# Patient Record
Sex: MALE | Race: BLACK OR AFRICAN AMERICAN | Employment: STUDENT | URBAN - METROPOLITAN AREA
[De-identification: names, ages, dates, MRNs, and addresses within clinical notes are randomized per-mention and may not be internally consistent; named-entity substitution may affect disease eponyms.]

---

## 2017-09-28 ENCOUNTER — HOSPITAL ENCOUNTER (EMERGENCY)
Age: 32
Discharge: HOME | End: 2017-09-28
Attending: EMERGENCY MEDICINE | Admitting: EMERGENCY MEDICINE
Payer: MEDICAID

## 2017-09-28 VITALS
HEART RATE: 88 BPM | SYSTOLIC BLOOD PRESSURE: 150 MMHG | TEMPERATURE: 97.6 F | WEIGHT: 260.36 LBS | DIASTOLIC BLOOD PRESSURE: 97 MMHG | HEIGHT: 71 IN | RESPIRATION RATE: 18 BRPM | OXYGEN SATURATION: 100 % | BODY MASS INDEX: 36.45 KG/M2
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
## 2017-09-28 DIAGNOSIS — S20.361A INSECT BITE OF CHEST, RIGHT, INITIAL ENCOUNTER: Primary | ICD-10-CM

## 2017-09-28 PROCEDURE — 99282 EMERGENCY DEPT VISIT SF MDM: CPT

## 2017-09-28 RX ORDER — CEPHALEXIN 500 MG/1
500 CAPSULE ORAL 4 TIMES DAILY
Qty: 28 CAP | Refills: 0 | Status: SHIPPED | OUTPATIENT
Start: 2017-09-28 | End: 2017-10-05

## 2017-09-28 NOTE — DISCHARGE INSTRUCTIONS
Insect Stings and Bites: Care Instructions  Your Care Instructions  Stings and bites from bees, wasps, ants, and other insects often cause pain, swelling, redness, and itching. In some people, especially children, the redness and swelling may be worse. It may extend several inches beyond the affected area. But in most cases, stings and bites don't cause reactions all over the body. If you have had a reaction to an insect sting or bite, you are at risk for a reaction if you get stung or bitten again. Follow-up care is a key part of your treatment and safety. Be sure to make and go to all appointments, and call your doctor if you are having problems. It's also a good idea to know your test results and keep a list of the medicines you take. How can you care for yourself at home? · Do not scratch or rub the skin where the sting or bite occurred. · Put a cold pack or ice cube on the area. Put a thin cloth between the ice and your skin. For some people, a paste of baking soda mixed with a little water helps relieve pain and decrease the reaction. · Take an over-the-counter antihistamine, such as diphenhydramine (Benadryl) or loratadine (Claritin), to relieve swelling, redness, and itching. Calamine lotion or hydrocortisone cream may also help. Do not give antihistamines to your child unless you have checked with the doctor first.  · Be safe with medicines. If your doctor prescribed medicine for your allergy, take it exactly as prescribed. Call your doctor if you think you are having a problem with your medicine. You will get more details on the specific medicines your doctor prescribes. · Your doctor may prescribe a shot of epinephrine to carry with you in case you have a severe reaction. Learn how and when to give yourself the shot, and keep it with you at all times. Make sure it has not . · Go to the emergency room anytime you have a severe reaction.  Go even if you have given yourself epinephrine and are feeling better. Symptoms can come back. When should you call for help? Call 911 anytime you think you may need emergency care. For example, call if:  · You have symptoms of a severe allergic reaction. These may include:  ¨ Sudden raised, red areas (hives) all over your body. ¨ Swelling of the throat, mouth, lips, or tongue. ¨ Trouble breathing. ¨ Passing out (losing consciousness). Or you may feel very lightheaded or suddenly feel weak, confused, or restless. Call your doctor now or seek immediate medical care if:  · You have symptoms of an allergic reaction not right at the sting or bite, such as:  ¨ A rash or small area of hives (raised, red areas on the skin). ¨ Itching. ¨ Swelling. ¨ Belly pain, nausea, or vomiting. · You have a lot of swelling around the site (such as your entire arm or leg is swollen). · You have signs of infection, such as:  ¨ Increased pain, swelling, redness, or warmth around the sting. ¨ Red streaks leading from the area. ¨ Pus draining from the sting. ¨ A fever. Watch closely for changes in your health, and be sure to contact your doctor if:  · You do not get better as expected. Where can you learn more? Go to http://jorge-cyndi.info/. Enter P390 in the search box to learn more about \"Insect Stings and Bites: Care Instructions. \"  Current as of: March 20, 2017  Content Version: 11.3  © 2692-8286 [employer]. Care instructions adapted under license by [employer] (which disclaims liability or warranty for this information). If you have questions about a medical condition or this instruction, always ask your healthcare professional. [place] [address] any warranty or liability for your use of this information.

## 2017-09-28 NOTE — ED PROVIDER NOTES
HPI Comments: Tana Ayala is a 34 y.o. male, with no significant pmhx, who presents ambulatory to the ED c/o worsening red, raised bump to his right upper flank x 3 days. Pt states that 3 days ago, he walked through [place], and thinks that he was bit by the spider. Pt never saw an insect, but started noticing the bump get worse. Pt denies any drainage from the bump, vomiting, abdominal pain, fever, or chills. Pt denies any known hx of long standing diseases or daily medication. PCP: Unknown provided located in [facility] Hx: (-) tobacco, (-) EtOH,  (-) Illicit Drugs    There are no other complaints, changes, or physical findings at this time. The history is provided by the patient. No  was used. No past medical history on file. No past surgical history on file. No family history on file. Social History     Social History    Marital status: SINGLE     Spouse name: N/A    Number of children: N/A    Years of education: N/A     Occupational History    Not on file. Social History Main Topics    Smoking status: Not on file    Smokeless tobacco: Not on file    Alcohol use Not on file    Drug use: Not on file    Sexual activity: Not on file     Other Topics Concern    Not on file     Social History Narrative         ALLERGIES: Review of patient's allergies indicates no known allergies. Review of Systems   Constitutional: Negative. Negative for chills and fever. HENT: Negative. Eyes: Negative. Respiratory: Negative. Cardiovascular: Negative. Gastrointestinal: Negative. Negative for abdominal pain and vomiting. Genitourinary: Negative. Musculoskeletal: Negative. Skin: Positive for color change. Neurological: Negative. Psychiatric/Behavioral: Negative. All other systems reviewed and are negative.       Vitals:    09/28/17 1357   BP: (!) 150/97   Pulse: 88   Resp: 18   Temp: 97.6 °F (36.4 °C)   SpO2: 100%   Weight: 118.1 kg (260 lb 5.8 oz)   Height: 5' 11\" (1.803 m)            Physical Exam   Constitutional: He is oriented to person, place, and time. He appears well-developed and well-nourished. No distress. 33 yo -American male   HENT:   Head: Normocephalic and atraumatic. Eyes: Conjunctivae are normal. Right eye exhibits no discharge. Left eye exhibits no discharge. Neck: Normal range of motion. Neck supple. Cardiovascular: Normal rate and regular rhythm. No murmur heard. Pulmonary/Chest: Effort normal and breath sounds normal. No respiratory distress. He has no wheezes. Lymphadenopathy:     He has no cervical adenopathy. Neurological: He is alert and oriented to person, place, and time. Skin: Skin is warm and dry. He is not diaphoretic. Single insect bite to the R chest wall with slight tenderness. No surrounding erythema or drainage. Psychiatric: He has a normal mood and affect. His behavior is normal.   Nursing note and vitals reviewed. MDM  Number of Diagnoses or Management Options  Insect bite of chest, right, initial encounter:   Diagnosis management comments:   DDx: insect bite, cellulitis, folliculitis        Amount and/or Complexity of Data Reviewed  Review and summarize past medical records: yes    Patient Progress  Patient progress: stable    ED Course       Procedures    Progress Note:  2:36 PM  Pt is requesting paper prescriptions. Written by MARCELO Ladd, as dictated by Mal King .    Progress Note:  2:36 PM  Discussed workup results/findings, and counseled pt regarding his diagnosis. Pt has been encouraged to follow-up as instructed. All questions have been answered, and pt conveyed understanding. Written by MARCELO Ladd, as dictated by Mal King .    IMPRESSION:  1. Insect bite of chest, right, initial encounter        PLAN:  1. Discharge home.   Current Discharge Medication List      START taking these medications    Details   cephALEXin (KEFLEX) 500 mg capsule Take 1 Cap by mouth four (4) times daily for 7 days. Qty: 28 Cap, Refills: 0           2. Follow-up Information     Follow up With Details Comments Contact Info    Your PCP in [place] or local PCP/Urgent Care/ER In 2 days As needed for wound check         3. Return to ED if worse   4. Wound care as discussed    DISCHARGE NOTE:  2:36 PM  Patient's results have been reviewed with them. Patient and/or family have verbally conveyed their understanding and agreement of the patient's signs, symptoms, diagnosis, treatment and prognosis and additionally agree to follow up as recommended or return to the Emergency Room should their condition change prior to follow-up. Discharge instructions have also been provided to the patient with some educational information regarding their diagnosis as well a list of reasons why they would want to return to the ER prior to their follow-up appointment should their condition change. This note is prepared by Melody Solano, acting as Scribe for Cindy Puga : The scribe's documentation has been prepared under my direction and personally reviewed by me in its entirety. I confirm that the note above accurately reflects all work, treatment, procedures, and medical decision making performed by me.